# Patient Record
Sex: MALE | Race: WHITE | NOT HISPANIC OR LATINO | Employment: UNEMPLOYED | URBAN - METROPOLITAN AREA
[De-identification: names, ages, dates, MRNs, and addresses within clinical notes are randomized per-mention and may not be internally consistent; named-entity substitution may affect disease eponyms.]

---

## 2020-07-22 ENCOUNTER — TREATMENT (OUTPATIENT)
Dept: PHYSICAL THERAPY | Facility: CLINIC | Age: 30
End: 2020-07-22

## 2020-07-22 ENCOUNTER — TRANSCRIBE ORDERS (OUTPATIENT)
Dept: PHYSICAL THERAPY | Facility: CLINIC | Age: 30
End: 2020-07-22

## 2020-07-22 DIAGNOSIS — G89.29 CHRONIC PAIN OF LEFT KNEE: Primary | ICD-10-CM

## 2020-07-22 DIAGNOSIS — M25.562 LEFT KNEE PAIN, UNSPECIFIED CHRONICITY: Primary | ICD-10-CM

## 2020-07-22 DIAGNOSIS — M25.562 CHRONIC PAIN OF LEFT KNEE: Primary | ICD-10-CM

## 2020-07-22 PROCEDURE — 97162 PT EVAL MOD COMPLEX 30 MIN: CPT | Performed by: PHYSICAL THERAPIST

## 2020-07-22 NOTE — PROGRESS NOTES
Physical Therapy Initial Evaluation and Plan of Care      Patient: José Miguel Macias   : 1990  Diagnosis/ICD-10 Code:  Chronic pain of left knee [M25.562, G89.29]  Referring practitioner: Shun Martinez MD    Subjective Evaluation    History of Present Illness  Mechanism of injury: Banged knee in September and about 1-2 months later knee started feeling very stiff and weak. Did have some clicking and popping but not much lately. No pain but if he has to walk or stand for multiple hours it gets stiff.     Does use compression sleeve some. Occasionally takes aspirin.       Patient Occupation: unemployed  Pain  No pain reported  Location: left underside of knee  Quality: tight and pulling  Aggravating factors: ambulation, standing and stairs    Patient Goals  Patient goals for therapy: return to sport/leisure activities, independence with ADLs/IADLs, increased strength, decreased edema, decreased pain, improved balance and increased motion             Objective          Palpation   Left   Hypertonic in the lateral gastrocnemius and medial gastrocnemius.     Active Range of Motion   Left Knee   Flexion: 122 (prox gastroc ) degrees   Extension: Left knee active extension: -2, feels strange on inferior patella     Right Knee   Flexion: 145 degrees   Extension: Right knee active extension: -5.     Additional Active Range of Motion Details  L hamstring 25 from neutral   R hamstring 25 from    Patellar Mobility   Left Knee Patellar tendons within functional limits include the medial, lateral, superior and inferior.     Patellar Static Positioning   Left Knee: WFL    Strength/Myotome Testing     Left Hip   Planes of Motion   Flexion: 4  Extension: 4  Abduction: 4    Right Hip   Planes of Motion   Flexion: 4-  Extension: 4  Abduction: 4-    Left Knee   Flexion: 4+  Extension: 4+  Quadriceps contraction: good    Tests     Left Knee   Negative anterior drawer, lateral Viraj, medial Viraj, patellar  apprehension, patellar compression, patella-femoral grind, posterior drawer, Thessaly's test at 5 degrees, Thessaly's test at 20 degrees, valgus stress test at 0 degrees, valgus stress test at 30 degrees, varus stress test at 0 degrees and varus stress test at 30 degrees.           Assessment & Plan     Assessment  Impairments: abnormal coordination, abnormal gait, abnormal muscle firing, abnormal muscle tone, abnormal or restricted ROM, activity intolerance, impaired balance, impaired physical strength, lacks appropriate home exercise program and pain with function  Assessment details: Patient is a 30 year old male presenting with left knee stiffness and general weakness. Patient presents with medial and lateral gastroc tightness, decreased knee flexion and terminal extension with feeling of tightness in proximal gastroc tendons, bilateral gross hip weakness, and left calf weakness. So sign of ligamentous damage. No neurologic deficit noted at this time. Patient is appropriate for physical therapy to address the above issues.   Prognosis: good  Prognosis details: Short Term Goals (3 weeks):  1. Patient will be independent with home exercise program.  2. Patient will demonstrate improved hip strength by 50%.  3. Patient will demonstrate improved knee mobility by 50%.     Long Term Goals (8 weeks):  1. Patient will be able to walk at least 20 minutes with knee pain no greater than 2/10.  2. Patient will demonstrate functional squat with knee pain no greater than 2/10.  3. Patient will be able to return to full work/home duty with knee pain no greater than 2/10.      Functional Limitations: walking and standing  Plan  Therapy options: will be seen for skilled physical therapy services  Planned modality interventions: ultrasound, traction, thermotherapy (hydrocollator packs), TENS, high voltage pulsed current (spasm management), high voltage pulsed current (pain management) and cryotherapy  Planned therapy  interventions: therapeutic activities, stretching, strengthening, spinal/joint mobilization, soft tissue mobilization, postural training, neuromuscular re-education, motor coordination training, manual therapy, abdominal trunk stabilization, ADL retraining, balance/weight-bearing training, body mechanics training, fine motor coordination training, functional ROM exercises, flexibility, gait training, IADL retraining, joint mobilization and home exercise program  Frequency: 2-3x/week.  Duration in visits: 12  Treatment plan discussed with: patient      EVAL ONLY     Timed Treatment:      mins   Total Treatment:     35   mins    PT SIGNATURE: Tonya Boo PT   DATE TREATMENT INITIATED: 7/23/2020    Initial Certification  Certification Period: 10/21/2020  I certify that the therapy services are furnished while this patient is under my care.  The services outlined above are required by this patient, and will be reviewed every 90 days.     PHYSICIAN:       DATE:     Please sign and return via fax to [phone].. Thank you, [facility].

## 2020-08-04 ENCOUNTER — TREATMENT (OUTPATIENT)
Dept: PHYSICAL THERAPY | Facility: CLINIC | Age: 30
End: 2020-08-04

## 2020-08-04 DIAGNOSIS — G89.29 CHRONIC PAIN OF LEFT KNEE: Primary | ICD-10-CM

## 2020-08-04 DIAGNOSIS — M25.562 CHRONIC PAIN OF LEFT KNEE: Primary | ICD-10-CM

## 2020-08-04 PROCEDURE — 97110 THERAPEUTIC EXERCISES: CPT | Performed by: PHYSICAL THERAPIST

## 2020-08-04 NOTE — PROGRESS NOTES
Visit #: 2    Subjective   José Miguelbayron Macias reports: Knee has felt some better since first visit. Did feel stiff when doing yard work.     Objective   Active left knee flexion in supine: 122 deg, patient sites behind knee (proximal medial gastric) as being tight    Left hamstring 25 deg from neutral.     No palpable tenderness in gastroc     See Exercise, Manual, and Modality Logs for complete treatment.       Assessment/Plan  Patient demonstrates improved knee flexion. Will continue working on gross hip strength and knee mobility.   Progress per Plan of Care           Manual Therapy:         mins  59623;  Therapeutic Exercise:    55     mins  51526;     Neuromuscular Alexis:        mins  88393;    Therapeutic Activity:          mins  58889;     Gait Training:           mins  13042;     Ultrasound:          mins  84824;   Iontophoresis         mins  43026   Electrical Stimulation:         mins  67378 ( );  Dry Needling          mins self-pay  Fluidotherapy          mins 49434    Timed Treatment:   55   mins   Total Treatment:     55   mins    Tonya Boo, PT  Physical Therapist

## 2020-08-12 ENCOUNTER — TREATMENT (OUTPATIENT)
Dept: PHYSICAL THERAPY | Facility: CLINIC | Age: 30
End: 2020-08-12

## 2020-08-12 DIAGNOSIS — G89.29 CHRONIC PAIN OF LEFT KNEE: Primary | ICD-10-CM

## 2020-08-12 DIAGNOSIS — M25.562 CHRONIC PAIN OF LEFT KNEE: Primary | ICD-10-CM

## 2020-08-12 PROCEDURE — 97110 THERAPEUTIC EXERCISES: CPT | Performed by: PHYSICAL THERAPIST

## 2020-08-12 NOTE — PROGRESS NOTES
Visit #: 3    Subjective   José Miguelradhika Macias reports: Improved since last session, could tell the exercises were helpful. Tightness feeling in back of knee with flexion reduced     Objective   Mild med-lat knee stability with 9 inch step up       See Exercise, Manual, and Modality Logs for complete treatment.       Assessment/Plan  Patient reports improvement in symptoms. Knee and ankle instability apparent in single leg activity.   Progress per Plan of Care and Progress strengthening /stabilization /functional activity           Manual Therapy:         mins  55133;  Therapeutic Exercise:    56     mins  80095;     Neuromuscular Alexis:        mins  00416;    Therapeutic Activity:          mins  48923;     Gait Training:           mins  42602;     Ultrasound:          mins  34418;   Iontophoresis          mins  78877   Electrical Stimulation:         mins  76988 ( );  Dry Needling          mins self-pay  Fluidotherapy          mins 40743    Timed Treatment:   56   mins   Total Treatment:     56   mins    Tonya Boo, PT  Physical Therapist

## 2020-08-19 ENCOUNTER — TREATMENT (OUTPATIENT)
Dept: PHYSICAL THERAPY | Facility: CLINIC | Age: 30
End: 2020-08-19

## 2020-08-19 DIAGNOSIS — G89.29 CHRONIC PAIN OF LEFT KNEE: Primary | ICD-10-CM

## 2020-08-19 DIAGNOSIS — M25.562 CHRONIC PAIN OF LEFT KNEE: Primary | ICD-10-CM

## 2020-08-19 PROCEDURE — 97110 THERAPEUTIC EXERCISES: CPT | Performed by: PHYSICAL THERAPIST

## 2020-08-19 NOTE — PROGRESS NOTES
Visit #: 4    Subjective   José Miguel Macias reports: Knee still gets stiff but better than last week.     Objective   See Exercise, Manual, and Modality Logs for complete treatment.       Assessment/Plan  Patient progressing well with exercises. Continue to work on knee stability, gastric and hamstring flexibility, and gross LE strength.   Progress per Plan of Care and Progress strengthening /stabilization /functional activity           Manual Therapy:         mins  75592;  Therapeutic Exercise:    56     mins  96209;     Neuromuscular Alexis:        mins  05764;    Therapeutic Activity:          mins  52348;     Gait Training:           mins  36991;     Ultrasound:         mins  29251;   Iontophoresis          mins  81151   Electrical Stimulation:         mins  19299 ( );  Dry Needling          mins self-pay  Fluidotherapy          mins 57559    Timed Treatment:   56   mins   Total Treatment:     56   mins    Tonya Boo PT  Physical Therapist

## 2020-09-14 ENCOUNTER — TREATMENT (OUTPATIENT)
Dept: PHYSICAL THERAPY | Facility: CLINIC | Age: 30
End: 2020-09-14

## 2020-09-14 DIAGNOSIS — M25.562 CHRONIC PAIN OF LEFT KNEE: Primary | ICD-10-CM

## 2020-09-14 DIAGNOSIS — G89.29 CHRONIC PAIN OF LEFT KNEE: Primary | ICD-10-CM

## 2020-09-14 PROCEDURE — 97110 THERAPEUTIC EXERCISES: CPT | Performed by: PHYSICAL THERAPIST

## 2020-09-14 NOTE — PROGRESS NOTES
Visit #: 5    Subjective   José Miguel Macias reports: Knees feels much looser, only a little tightness/discomfort at top of calf.     Objective   Reduced tenderness to palpation proximal lateral calf.     Hamstring length: 10 deg from neutral      See Exercise, Manual, and Modality Logs for complete treatment.       Assessment/Plan  Hamstring flexibility much improved. Will continue working on flexibility and knee strength.   Progress per Plan of Care and Progress strengthening /stabilization /functional activity           Manual Therapy:         mins  21783;  Therapeutic Exercise:    55     mins  75932;     Neuromuscular Alexis:        mins  73662;    Therapeutic Activity:          mins  27294;     Gait Training:           mins  83836;     Ultrasound:         mins  57771;   Iontophoresis          mins  89439   Electrical Stimulation:         mins  14851 ( );  Dry Needling         mins self-pay  Fluidotherapy          mins 52345    Timed Treatment:   55   mins   Total Treatment:     55   mins    Tonya Boo, PT  Physical Therapist

## 2020-09-24 ENCOUNTER — TREATMENT (OUTPATIENT)
Dept: PHYSICAL THERAPY | Facility: CLINIC | Age: 30
End: 2020-09-24

## 2020-09-24 DIAGNOSIS — M25.562 CHRONIC PAIN OF LEFT KNEE: Primary | ICD-10-CM

## 2020-09-24 DIAGNOSIS — G89.29 CHRONIC PAIN OF LEFT KNEE: Primary | ICD-10-CM

## 2020-09-24 PROCEDURE — 97110 THERAPEUTIC EXERCISES: CPT | Performed by: PHYSICAL THERAPIST

## 2020-09-24 NOTE — PROGRESS NOTES
Visit #: 6    Subjective   José Miguel Macias reports: Knee continues to feel more stable and will less pressure in back of knee.     Objective   Knee AROM bilaterally WFL and equal     See Exercise, Manual, and Modality Logs for complete treatment.       Assessment/Plan  Patient demonstrates equal knee flexion mobility. Likely to be discharged soon.   Progress per Plan of Care           Manual Therapy:         mins  03256;  Therapeutic Exercise:    56     mins  17238;     Neuromuscular Alexis:        mins  94072;    Therapeutic Activity:          mins  16756;     Gait Training:           mins  84849;     Ultrasound:          mins  86284;   Iontophoresis          mins  41077   Electrical Stimulation:         mins  41973 ( );  Dry Needling          mins self-pay  Fluidotherapy          mins 58035    Timed Treatment:   56   mins   Total Treatment:     56   mins    Tonya Boo PT  Physical Therapist

## 2020-10-13 ENCOUNTER — TREATMENT (OUTPATIENT)
Dept: PHYSICAL THERAPY | Facility: CLINIC | Age: 30
End: 2020-10-13

## 2020-10-13 PROCEDURE — 97110 THERAPEUTIC EXERCISES: CPT | Performed by: PHYSICAL THERAPIST

## 2020-10-13 NOTE — PROGRESS NOTES
Discharge Note     Visit #: 7    Subjective   José Miguel Macias reports: Has been running daily without discomfort. Would like to continue with strengthening program on his own.     Objective   Left knee mobility WFL.     See Exercise, Manual, and Modality Logs for complete treatment.       Assessment/Plan  Patient given general LE strengthening and stretching program appropriate for runners. He reports resolution of knee issue.   Other  Patient is discharged with all goals met .       Short Term Goals (3 weeks):  1. Patient will be independent with home exercise program.  MET    2. Patient will demonstrate improved hip strength by 50%.  MET   3. Patient will demonstrate improved knee mobility by 50%.   MET     Long Term Goals (8 weeks):  1. Patient will be able to walk at least 20 minutes with knee pain no greater than 2/10.  MET  2. Patient will demonstrate functional squat with knee pain no greater than 2/10.   MET  3. Patient will be able to return to full work/home duty with knee pain no greater than 2/10  MET        Manual Therapy:         mins  20889;  Therapeutic Exercise:    53     mins  37464;     Neuromuscular Alexis:        mins  80084;    Therapeutic Activity:          mins  28661;     Gait Training:          mins  15859;     Ultrasound:          mins  54798;   Iontophoresis          mins  67123   Electrical Stimulation:        mins  67672 (MC );  Dry Needling          mins self-pay  Fluidotherapy          mins 93676    Timed Treatment:   53   mins   Total Treatment:     53   mins    Tonya Boo PT  Physical Therapist